# Patient Record
Sex: MALE | Race: BLACK OR AFRICAN AMERICAN | NOT HISPANIC OR LATINO | Employment: UNEMPLOYED | URBAN - METROPOLITAN AREA
[De-identification: names, ages, dates, MRNs, and addresses within clinical notes are randomized per-mention and may not be internally consistent; named-entity substitution may affect disease eponyms.]

---

## 2021-06-04 ENCOUNTER — OFFICE VISIT (OUTPATIENT)
Dept: FAMILY MEDICINE CLINIC | Facility: CLINIC | Age: 41
End: 2021-06-04

## 2021-06-04 VITALS
SYSTOLIC BLOOD PRESSURE: 110 MMHG | HEIGHT: 71 IN | HEART RATE: 99 BPM | DIASTOLIC BLOOD PRESSURE: 80 MMHG | BODY MASS INDEX: 44.1 KG/M2 | RESPIRATION RATE: 16 BRPM | TEMPERATURE: 97.5 F | WEIGHT: 315 LBS | OXYGEN SATURATION: 95 %

## 2021-06-04 DIAGNOSIS — R22.41 MASS OF RIGHT FOOT: ICD-10-CM

## 2021-06-04 DIAGNOSIS — R53.82 CHRONIC FATIGUE: ICD-10-CM

## 2021-06-04 DIAGNOSIS — M25.50 ARTHRALGIA OF MULTIPLE JOINTS: Primary | ICD-10-CM

## 2021-06-04 DIAGNOSIS — M25.40 JOINT SWELLING: ICD-10-CM

## 2021-06-04 PROCEDURE — 99204 OFFICE O/P NEW MOD 45 MIN: CPT | Performed by: NURSE PRACTITIONER

## 2021-06-04 RX ORDER — NAPROXEN SODIUM 220 MG
220 TABLET ORAL 2 TIMES DAILY PRN
COMMUNITY

## 2021-06-04 NOTE — PROGRESS NOTES
Subjective   José Miguel Victor is a 40 y.o. male.     History of Present Illness   José Miguel Victor 40 y.o. male who presents today for a new patient appointment.    he has a history of There is no problem list on file for this patient.  .  he is here to establish care and workup of joint pain I reviewed the PFSH recorded today by my MA/LPN staff.       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  He reports 1-2 alcoholic beverages per week.      He is concerned about joint pain in both hips and knees for a few months. Denies any injury.  Denies joint swelling. Denies FH of autoimmune arthritis.      He reports mass to right foot that has been there for some time and increased in size.  Reports it is very painful at times. He has not seen anyone about this.     He does not take any prescription medication.        The following portions of the patient's history were reviewed and updated as appropriate: allergies, current medications, past family history, past medical history, past social history, past surgical history and problem list.    Review of Systems   Constitutional: Negative for fatigue.   Respiratory: Negative for cough and shortness of breath.    Cardiovascular: Negative for chest pain and palpitations.   Musculoskeletal: Positive for arthralgias and gait problem.   Skin: Negative for rash.   Neurological: Negative for numbness.   Psychiatric/Behavioral: Negative for dysphoric mood and sleep disturbance. The patient is not nervous/anxious.        Objective   Physical Exam  Vitals and nursing note reviewed.   Constitutional:       Appearance: He is well-developed.   Neck:      Vascular: No carotid bruit.   Cardiovascular:      Rate and Rhythm: Normal rate and regular rhythm.   Pulmonary:      Effort: Pulmonary effort is normal.      Breath sounds: Normal breath sounds.   Musculoskeletal:      Right knee: No swelling,  deformity or bony tenderness. No tenderness.      Left knee: No swelling, deformity or bony tenderness. No tenderness.      Right foot: Deformity and tenderness present.        Legs:    Neurological:      Mental Status: He is alert and oriented to person, place, and time.   Psychiatric:         Mood and Affect: Mood normal.         Behavior: Behavior normal.         Thought Content: Thought content normal.         Judgment: Judgment normal.         Assessment/Plan   Problems Addressed this Visit     None      Visit Diagnoses     Arthralgia of multiple joints    -  Primary    Relevant Orders    Comprehensive metabolic panel    Lipid panel    CBC and Differential    TSH    ROME    Rheumatoid Factor    Uric acid    Vitamin D 25 Hydroxy    Lyme, IgM, Early Test / Reflex    Lyme, Total Antibody Test / Reflex    [place] (IgG / M)    Joint swelling        Relevant Orders    Comprehensive metabolic panel    Lipid panel    CBC and Differential    TSH    ROME    Rheumatoid Factor    Uric acid    Vitamin D 25 Hydroxy    Lyme, IgM, Early Test / Reflex    Lyme, Total Antibody Test / Reflex    [place] (IgG / M)    Chronic fatigue        Relevant Orders    Comprehensive metabolic panel    Lipid panel    CBC and Differential    TSH    [facility]OME    Rheumatoid Factor    Uric acid    Vitamin D 25 Hydroxy    Lyme, IgM, Early Test / Reflex    Lyme, Total Antibody Test / Reflex    [place] (IgG / M)    Mass of right foot        Relevant Orders    XR Foot 3+ View Bilateral (In Office)      Diagnoses       Codes Comments    Arthralgia of multiple joints    -  Primary ICD-10-CM: M25.50  ICD-9-CM: 719.49     Joint swelling     ICD-10-CM: M25.40  ICD-9-CM: 719.00     Chronic fatigue     ICD-10-CM: R53.82  ICD-9-CM: 780.79     Mass of right foot     ICD-10-CM: R22.41  ICD-9-CM: 782.2         Xray not available today. He will stop in one day next week to get xray of right foot.   Labs today.

## 2021-06-08 LAB
25(OH)D3+25(OH)D2 SERPL-MCNC: 4.6 NG/ML (ref 30–100)
ALBUMIN SERPL-MCNC: 4.1 G/DL (ref 4–5)
ALBUMIN/GLOB SERPL: 1.3 {RATIO} (ref 1.2–2.2)
ALP SERPL-CCNC: 74 IU/L (ref 48–121)
ALT SERPL-CCNC: 8 IU/L (ref 0–44)
ANA SER QL: NEGATIVE
AST SERPL-CCNC: 15 IU/L (ref 0–40)
B BURGDOR IGG+IGM SER-ACNC: <0.91 ISR (ref 0–0.9)
B BURGDOR IGM SER IA-ACNC: <0.8 INDEX (ref 0–0.79)
BASOPHILS # BLD AUTO: 0.1 X10E3/UL (ref 0–0.2)
BASOPHILS NFR BLD AUTO: 0 %
BILIRUB SERPL-MCNC: 0.6 MG/DL (ref 0–1.2)
BUN SERPL-MCNC: 8 MG/DL (ref 6–24)
BUN/CREAT SERPL: 6 (ref 9–20)
CALCIUM SERPL-MCNC: 9.4 MG/DL (ref 8.7–10.2)
CHLORIDE SERPL-SCNC: 101 MMOL/L (ref 96–106)
CHOLEST SERPL-MCNC: 195 MG/DL (ref 100–199)
CO2 SERPL-SCNC: 22 MMOL/L (ref 20–29)
CREAT SERPL-MCNC: 1.27 MG/DL (ref 0.76–1.27)
EOSINOPHIL # BLD AUTO: 0.2 X10E3/UL (ref 0–0.4)
EOSINOPHIL NFR BLD AUTO: 1 %
ERYTHROCYTE [DISTWIDTH] IN BLOOD BY AUTOMATED COUNT: 15.2 % (ref 11.6–15.4)
GLOBULIN SER CALC-MCNC: 3.2 G/DL (ref 1.5–4.5)
GLUCOSE SERPL-MCNC: 96 MG/DL (ref 65–99)
HCT VFR BLD AUTO: 40.6 % (ref 37.5–51)
HDLC SERPL-MCNC: 37 MG/DL
HGB BLD-MCNC: 13.1 G/DL (ref 13–17.7)
IMM GRANULOCYTES # BLD AUTO: 0 X10E3/UL (ref 0–0.1)
IMM GRANULOCYTES NFR BLD AUTO: 0 %
LDLC SERPL CALC-MCNC: 137 MG/DL (ref 0–99)
LYMPHOCYTES # BLD AUTO: 2.5 X10E3/UL (ref 0.7–3.1)
LYMPHOCYTES NFR BLD AUTO: 15 %
MCH RBC QN AUTO: 27.8 PG (ref 26.6–33)
MCHC RBC AUTO-ENTMCNC: 32.3 G/DL (ref 31.5–35.7)
MCV RBC AUTO: 86 FL (ref 79–97)
MONOCYTES # BLD AUTO: 1 X10E3/UL (ref 0.1–0.9)
MONOCYTES NFR BLD AUTO: 6 %
NEUTROPHILS # BLD AUTO: 12.1 X10E3/UL (ref 1.4–7)
NEUTROPHILS NFR BLD AUTO: 78 %
PLATELET # BLD AUTO: 504 X10E3/UL (ref 150–450)
POTASSIUM SERPL-SCNC: 4.2 MMOL/L (ref 3.5–5.2)
PROT SERPL-MCNC: 7.3 G/DL (ref 6–8.5)
R RICKETTSI IGG SER QL IA: NEGATIVE
R RICKETTSI IGM SER-ACNC: 0.16 INDEX (ref 0–0.89)
RBC # BLD AUTO: 4.71 X10E6/UL (ref 4.14–5.8)
RHEUMATOID FACT SERPL-ACNC: 11.5 IU/ML (ref 0–13.9)
SODIUM SERPL-SCNC: 137 MMOL/L (ref 134–144)
TRIGL SERPL-MCNC: 117 MG/DL (ref 0–149)
TSH SERPL DL<=0.005 MIU/L-ACNC: 2.64 UIU/ML (ref 0.45–4.5)
URATE SERPL-MCNC: 11.4 MG/DL (ref 3.8–8.4)
VLDLC SERPL CALC-MCNC: 21 MG/DL (ref 5–40)
WBC # BLD AUTO: 15.9 X10E3/UL (ref 3.4–10.8)

## 2021-06-09 DIAGNOSIS — D72.829 LEUKOCYTOSIS, UNSPECIFIED TYPE: Primary | ICD-10-CM

## 2021-06-09 DIAGNOSIS — E79.0 ELEVATED URIC ACID IN BLOOD: ICD-10-CM

## 2021-06-09 DIAGNOSIS — E55.9 VITAMIN D DEFICIENCY: ICD-10-CM

## 2021-06-09 NOTE — TELEPHONE ENCOUNTER
----- Message from ZAHIRA Arredondo sent at 6/8/2021  3:06 PM EDT -----  Wbc count is elevated.  Need to recheck in 1 week.  His uric acid level is pretty high which can indicate gout as the cause of his arthritis.  Need to start him on allopurinol 300 mg daily.  His vitamin D is also extremely low.  Need to start him on cholecalciferol 50,000 units twice weekly.  This can also be contributing to his joint pain.  Need to recheck his Vit D and uric acid level in 4 weeks. CBC preferably repeat in 1 week.

## 2021-06-10 RX ORDER — ERGOCALCIFEROL 1.25 MG/1
CAPSULE ORAL
Qty: 8 CAPSULE | Refills: 5 | Status: SHIPPED | OUTPATIENT
Start: 2021-06-10

## 2021-06-10 RX ORDER — ALLOPURINOL 300 MG/1
300 TABLET ORAL DAILY
Qty: 30 TABLET | Refills: 5 | Status: SHIPPED | OUTPATIENT
Start: 2021-06-10